# Patient Record
Sex: FEMALE | Race: WHITE | Employment: UNEMPLOYED | URBAN - METROPOLITAN AREA
[De-identification: names, ages, dates, MRNs, and addresses within clinical notes are randomized per-mention and may not be internally consistent; named-entity substitution may affect disease eponyms.]

---

## 2018-07-22 ENCOUNTER — HOSPITAL ENCOUNTER (OUTPATIENT)
Age: 28
Discharge: HOME | End: 2018-07-22
Attending: FAMILY MEDICINE
Payer: COMMERCIAL

## 2018-07-22 VITALS
SYSTOLIC BLOOD PRESSURE: 124 MMHG | DIASTOLIC BLOOD PRESSURE: 79 MMHG | WEIGHT: 180 LBS | BODY MASS INDEX: 29.99 KG/M2 | RESPIRATION RATE: 20 BRPM | TEMPERATURE: 98 F | OXYGEN SATURATION: 100 % | HEART RATE: 86 BPM | HEIGHT: 65 IN

## 2018-07-22 DIAGNOSIS — J02.9 ACUTE PHARYNGITIS, UNSPECIFIED ETIOLOGY: Primary | ICD-10-CM

## 2018-07-22 LAB — S PYO AG THROAT QL: NEGATIVE

## 2018-07-22 PROCEDURE — 99203 OFFICE O/P NEW LOW 30 MIN: CPT

## 2018-07-22 PROCEDURE — 99204 OFFICE O/P NEW MOD 45 MIN: CPT

## 2018-07-22 PROCEDURE — 87081 CULTURE SCREEN ONLY: CPT | Performed by: FAMILY MEDICINE

## 2018-07-22 PROCEDURE — 87430 STREP A AG IA: CPT

## 2018-07-22 NOTE — ED INITIAL ASSESSMENT (HPI)
PATIENT ARRIVED AMBULATORY TO ROOM C/O A SORE THROAT THAT STARTED YESTERDAY. NO NASAL CONGESTION. NO COUGH. NO FEVERS. NO V/D. PATIENT STATES SHE IS CURRENTLY 11 WEEKS PREGNANT.

## 2018-07-22 NOTE — ED PROVIDER NOTES
Patient presents with:  Sore Throat      HPI:     Epifanio Landaverde is a 25year old female who presents with for chief complaint of nasal congestion, sore throat, some white spots on the tonsils  X 1 day.     The patient denies complaints of fevers, chills, swe regular rate and rhythm  Skin: Skin color, texture, turgor normal. No rashes or lesions      Assessment/Plan:     Labs performed this visit:    Recent Results (from the past 10 hour(s))  -[facility] POCT RAPID STREP   Collection Time: 07/22/18  1:55 PM   Result V

## 2018-10-06 ENCOUNTER — HOSPITAL ENCOUNTER (OUTPATIENT)
Age: 28
Discharge: HOME | End: 2018-10-06
Payer: COMMERCIAL

## 2018-10-06 VITALS
RESPIRATION RATE: 18 BRPM | HEART RATE: 91 BPM | BODY MASS INDEX: 30.82 KG/M2 | DIASTOLIC BLOOD PRESSURE: 68 MMHG | WEIGHT: 185 LBS | OXYGEN SATURATION: 98 % | TEMPERATURE: 98 F | SYSTOLIC BLOOD PRESSURE: 121 MMHG | HEIGHT: 65 IN

## 2018-10-06 DIAGNOSIS — J02.9 VIRAL PHARYNGITIS: Primary | ICD-10-CM

## 2018-10-06 PROCEDURE — 87081 CULTURE SCREEN ONLY: CPT | Performed by: PHYSICIAN ASSISTANT

## 2018-10-06 PROCEDURE — 99213 OFFICE O/P EST LOW 20 MIN: CPT

## 2018-10-06 PROCEDURE — 87430 STREP A AG IA: CPT

## 2018-10-06 PROCEDURE — 99214 OFFICE O/P EST MOD 30 MIN: CPT

## 2018-10-06 NOTE — ED PROVIDER NOTES
Patient Seen in: [address] [place] [address]    History   No chief complaint on file.     Stated Complaint: sore throat    HPI    Patient is a 24-year-old  currently 22-week pregnant female who presents for evaluation of sore throat x Mouth/Throat: Uvula is midline and mucous membranes are normal. Posterior oropharyngeal erythema present. No tonsillar abscesses. Eyes: Conjunctivae are normal. Pupils are equal, round, and reactive to light. Neck: Normal range of motion.  Neck supple

## 2018-10-06 NOTE — ED INITIAL ASSESSMENT (HPI)
Sore throat for 1 day, + sick contact for strep, pt is 22 weeks pregnant,  with prenatal care, denies fever